# Patient Record
Sex: FEMALE | Race: WHITE | NOT HISPANIC OR LATINO | Employment: FULL TIME | URBAN - METROPOLITAN AREA
[De-identification: names, ages, dates, MRNs, and addresses within clinical notes are randomized per-mention and may not be internally consistent; named-entity substitution may affect disease eponyms.]

---

## 2017-06-14 ENCOUNTER — OFFICE VISIT (OUTPATIENT)
Dept: ORTHOPEDIC SURGERY | Facility: CLINIC | Age: 32
End: 2017-06-14

## 2017-06-14 VITALS
SYSTOLIC BLOOD PRESSURE: 110 MMHG | HEIGHT: 65 IN | BODY MASS INDEX: 29.32 KG/M2 | HEART RATE: 82 BPM | WEIGHT: 176 LBS | DIASTOLIC BLOOD PRESSURE: 64 MMHG

## 2017-06-14 DIAGNOSIS — S93.412A SPRAIN OF CALCANEOFIBULAR LIGAMENT OF LEFT ANKLE, INITIAL ENCOUNTER: Primary | ICD-10-CM

## 2017-06-14 PROCEDURE — 99203 OFFICE O/P NEW LOW 30 MIN: CPT | Performed by: ORTHOPAEDIC SURGERY

## 2017-06-14 RX ORDER — GABAPENTIN 800 MG/1
900 TABLET ORAL 3 TIMES DAILY
COMMUNITY

## 2017-06-14 RX ORDER — BUDESONIDE AND FORMOTEROL FUMARATE DIHYDRATE 160; 4.5 UG/1; UG/1
2 AEROSOL RESPIRATORY (INHALATION)
COMMUNITY

## 2017-06-14 RX ORDER — CETIRIZINE HYDROCHLORIDE 10 MG/1
10 TABLET ORAL DAILY
COMMUNITY

## 2017-06-14 RX ORDER — LAMOTRIGINE 100 MG/1
400 TABLET ORAL DAILY
COMMUNITY

## 2017-06-14 RX ORDER — LURASIDONE HYDROCHLORIDE 40 MG/1
40 TABLET, FILM COATED ORAL DAILY
COMMUNITY
End: 2018-07-26

## 2017-06-14 RX ORDER — SPIRONOLACTONE 25 MG/1
25 TABLET ORAL DAILY
COMMUNITY
End: 2017-06-26

## 2017-06-14 RX ORDER — MONTELUKAST SODIUM 10 MG/1
10 TABLET ORAL NIGHTLY
COMMUNITY
End: 2018-07-26

## 2017-06-14 NOTE — PROGRESS NOTES
Subjective: Left foot and ankle pain     Patient ID: Tanisha Bustamante is a 32 y.o. female.    Chief Complaint:    History of Present Illness 32-year-old female injured her left foot and ankle playing with her Regarding class back on 30 May which sustained in an apparent inversion injury of the foot.  Was seen in urgent care and x-rays done which showed a possible hairline fracture the fifth metatarsal base presents here for evaluation.  She was placed in a fracture boot at the urgent care center.       Social History     Occupational History   • Not on file.     Social History Main Topics   • Smoking status: Never Smoker   • Smokeless tobacco: Never Used   • Alcohol use Yes   • Drug use: Defer   • Sexual activity: Defer      Review of Systems   Constitutional: Negative for chills, diaphoresis, fever and unexpected weight change.   HENT: Negative for hearing loss, nosebleeds, sore throat and tinnitus.    Eyes: Negative for pain and visual disturbance.   Respiratory: Negative for cough, shortness of breath and wheezing.    Cardiovascular: Negative for chest pain and palpitations.   Gastrointestinal: Negative for abdominal pain, diarrhea, nausea and vomiting.   Endocrine: Negative for cold intolerance, heat intolerance and polydipsia.   Genitourinary: Negative for difficulty urinating, dysuria and hematuria.   Musculoskeletal: Negative for arthralgias, joint swelling and myalgias.   Skin: Negative for rash and wound.   Allergic/Immunologic: Positive for environmental allergies.   Neurological: Negative for dizziness, syncope and numbness.   Hematological: Does not bruise/bleed easily.   Psychiatric/Behavioral: Positive for sleep disturbance. Negative for dysphoric mood. The patient is not nervous/anxious.    All other systems reviewed and are negative.        Past Medical History:   Diagnosis Date   • Asthma    • Depression with anxiety      History reviewed. No pertinent surgical history.  History reviewed. No pertinent  family history.      Objective:  Vitals:    06/14/17 1343   BP: 110/64   Pulse: 82     Last 3 weights    06/14/17  1343   Weight: 176 lb (79.8 kg)     Body mass index is 29.29 kg/(m^2).       Ortho Exam  I reviewed the AP lateral oblique view of the left foot and ankle done in urgent care.  I believe I see where they think she might have a nondisplaced hairline fracture the base of the fifth metatarsal.  It is difficult to tell for sure.  On exam she is alert and oriented ×3.  Examination of the foot though shows actually no tenderness of the fifth metatarsal base.  There is some mild swelling over the lateral aspect of the foot and she describes more pain to the toes of the fourth and fifth.  But there is no real tenderness there.  Some mild tenderness over the calcaneofibular ligament but there is no instability there is no motor deficit good distal pulses no sensory loss no ecchymosis.  She is active dorsi and plantarflexion against resistance without pain and is again no pain whatsoever palpation over the fifth metatarsal.    Assessment:       1. Sprain of calcaneofibular ligament of left ankle, initial encounter          Plan:      I explained to her looking at the x-rays alone he can be difficult to tell if there is a fracture not correlated with her exam I believe there is no fracture.  Until she get out of the fracture boot and I would wear tennis shoes.  Support.  Continue the ibuprofen 800 3 times a day to see back on the 26th for follow-up evaluation.  She was in agreement with the above-stated plan.      Work Status:    GREGORIO query complete.    Orders:  No orders of the defined types were placed in this encounter.      Medications:  New Medications Ordered This Visit   Medications   • cetirizine (zyrTEC) 10 MG tablet     Sig: Take 10 mg by mouth Daily.   • montelukast (SINGULAIR) 10 MG tablet     Sig: Take 10 mg by mouth Every Night.   • spironolactone (ALDACTONE) 25 MG tablet     Sig: Take 25 mg by  mouth Daily.   • budesonide-formoterol (SYMBICORT) 160-4.5 MCG/ACT inhaler     Sig: Inhale 2 puffs 2 (Two) Times a Day.   • lurasidone (LATUDA) 40 MG tablet tablet     Sig: Take 40 mg by mouth Daily.   • lamoTRIgine (LaMICtal) 100 MG tablet     Sig: Take 400 mg by mouth Daily.   • gabapentin (NEURONTIN) 800 MG tablet     Sig: Take 900 mg by mouth 3 (Three) Times a Day.       Followup:  Return in about 12 days (around 6/26/2017).          Dragon transcription disclaimer     Much of this encounter note is an electronic transcription/translation of spoken language to printed text. The electronic translation of spoken language may permit erroneous, or at times, nonsensical words or phrases to be inadvertently transcribed. Although I have reviewed the note for such errors, some may still exist.

## 2017-06-26 ENCOUNTER — OFFICE VISIT (OUTPATIENT)
Dept: ORTHOPEDIC SURGERY | Facility: CLINIC | Age: 32
End: 2017-06-26

## 2017-06-26 VITALS — WEIGHT: 176 LBS | HEIGHT: 65 IN | BODY MASS INDEX: 29.32 KG/M2

## 2017-06-26 DIAGNOSIS — S93.412A SPRAIN OF CALCANEOFIBULAR LIGAMENT OF LEFT ANKLE, INITIAL ENCOUNTER: Primary | ICD-10-CM

## 2017-06-26 PROCEDURE — 99212 OFFICE O/P EST SF 10 MIN: CPT | Performed by: ORTHOPAEDIC SURGERY

## 2017-06-26 RX ORDER — SPIRONOLACTONE 100 MG/1
TABLET, FILM COATED ORAL
COMMUNITY
Start: 2017-06-20

## 2017-06-26 NOTE — PROGRESS NOTES
Subjective: Left ankle sprain     Patient ID: Tanisha Bustamante is a 32 y.o. female.    Chief Complaint:    History of Present Illness patient seen in follow-up to the left ankle sprain.  At this time she completely asymptomatic with no pain in the ankle.  His no tenderness with ambulation or any activity.  She is not wearing the boot this time is had no discomfort.       Social History     Occupational History   • Not on file.     Social History Main Topics   • Smoking status: Never Smoker   • Smokeless tobacco: Never Used   • Alcohol use Yes   • Drug use: Defer   • Sexual activity: Defer      Review of Systems   Constitutional: Negative for chills, diaphoresis, fever and unexpected weight change.   HENT: Negative for hearing loss, nosebleeds, sore throat and tinnitus.    Eyes: Negative for pain and visual disturbance.   Respiratory: Negative for cough, shortness of breath and wheezing.    Cardiovascular: Negative for chest pain and palpitations.   Gastrointestinal: Negative for abdominal pain, diarrhea, nausea and vomiting.   Endocrine: Negative for cold intolerance, heat intolerance and polydipsia.   Genitourinary: Negative for difficulty urinating, dysuria and hematuria.   Musculoskeletal: Positive for joint swelling and myalgias.   Skin: Negative for rash and wound.   Allergic/Immunologic: Negative for environmental allergies.   Neurological: Negative for dizziness, syncope and numbness.   Hematological: Does not bruise/bleed easily.   Psychiatric/Behavioral: Negative for dysphoric mood and sleep disturbance. The patient is not nervous/anxious.          Past Medical History:   Diagnosis Date   • Asthma    • Depression with anxiety      History reviewed. No pertinent surgical history.  History reviewed. No pertinent family history.      Objective:  There were no vitals filed for this visit.  Last 3 weights    06/26/17  1351   Weight: 176 lb (79.8 kg)     Body mass index is 29.29 kg/(m^2).       Ortho Exam  she is  alert and oriented ×3.  Examination of the ankle shows no tenderness over the lateral medial ligament complex.  No pain over the base of the fifth metacarpal with her was a question as to a nondisplaced fracture.  There is no swelling noted good distal pulses no sensory loss.    Assessment:       1. Sprain of calcaneofibular ligament of left ankle, initial encounter          Plan:      activity as tolerated return to see me as needed.  Patient will resume all activities      Work Status:    GREGORIO query complete.    Orders:  No orders of the defined types were placed in this encounter.      Medications:  New Medications Ordered This Visit   Medications   • spironolactone (ALDACTONE) 100 MG tablet       Followup:  Return if symptoms worsen or fail to improve.          Dragon transcription disclaimer     Much of this encounter note is an electronic transcription/translation of spoken language to printed text. The electronic translation of spoken language may permit erroneous, or at times, nonsensical words or phrases to be inadvertently transcribed. Although I have reviewed the note for such errors, some may still exist.

## 2018-07-26 ENCOUNTER — OFFICE VISIT (OUTPATIENT)
Dept: ORTHOPEDIC SURGERY | Facility: CLINIC | Age: 33
End: 2018-07-26

## 2018-07-26 VITALS — TEMPERATURE: 98.4 F | WEIGHT: 165 LBS | BODY MASS INDEX: 27.49 KG/M2 | HEIGHT: 65 IN

## 2018-07-26 DIAGNOSIS — S83.241A OTHER TEAR OF MEDIAL MENISCUS OF RIGHT KNEE AS CURRENT INJURY, INITIAL ENCOUNTER: ICD-10-CM

## 2018-07-26 DIAGNOSIS — G89.29 CHRONIC PAIN OF RIGHT KNEE: Primary | ICD-10-CM

## 2018-07-26 DIAGNOSIS — M25.561 CHRONIC PAIN OF RIGHT KNEE: Primary | ICD-10-CM

## 2018-07-26 PROCEDURE — 99213 OFFICE O/P EST LOW 20 MIN: CPT | Performed by: ORTHOPAEDIC SURGERY

## 2018-07-26 PROCEDURE — 73562 X-RAY EXAM OF KNEE 3: CPT | Performed by: ORTHOPAEDIC SURGERY

## 2018-07-26 RX ORDER — FOLIC ACID 1 MG/1
1 TABLET ORAL DAILY
COMMUNITY

## 2018-07-26 RX ORDER — CETIRIZINE HYDROCHLORIDE 10 MG/1
TABLET ORAL
COMMUNITY
End: 2018-07-26

## 2018-07-26 RX ORDER — CHLORAL HYDRATE 500 MG
CAPSULE ORAL
COMMUNITY

## 2018-07-26 RX ORDER — TRAZODONE HYDROCHLORIDE 100 MG/1
100 TABLET ORAL
COMMUNITY
End: 2018-07-26

## 2018-07-26 RX ORDER — LAMOTRIGINE 100 MG/1
TABLET ORAL
COMMUNITY
End: 2018-07-26 | Stop reason: SDUPTHER

## 2018-07-26 NOTE — PROGRESS NOTES
New Right Knee      Patient: Tanisha POON        YOB: 1985    Medical Record Number: [id number]        Chief Complaints: right knee pain  Chief Complaint   Patient presents with   • Right Knee - Establish Care           History of Present Illness: This is a  33 y.o. female who presents complaining of right knee pain began first of April she was running stress fracture her footas diagnosed by podiatrist also noticed pain anterior medially in the right knee.  She was training for the mini at that time she has some night pain had some swelling has pain going down hills no prior history of similar symptoms she is a teacher [employer] symptoms are described as moderate intermittent stabbing with symptoms worsening with walking or running better with anti-inflammatory past medical history marked for asthma depression    Allergies: No Known Allergies    Medications:   Home Medications:  Current Outpatient Prescriptions on File Prior to Visit   Medication Sig   • budesonide-formoterol (SYMBICORT) 160-4.5 MCG/ACT inhaler Inhale 2 puffs 2 (Two) Times a Day.   • cetirizine (zyrTEC) 10 MG tablet Take 10 mg by mouth Daily.   • gabapentin (NEURONTIN) 800 MG tablet Take 900 mg by mouth 3 (Three) Times a Day.   • lamoTRIgine (LaMICtal) 100 MG tablet Take 400 mg by mouth Daily.   • spironolactone (ALDACTONE) 100 MG tablet    • traZODone (DESYREL) 100 MG tablet TAKE 2 TABLETS BY MOUTH AT BEDTIME    • [DISCONTINUED] lurasidone (LATUDA) 40 MG tablet tablet Take 40 mg by mouth Daily.   • [DISCONTINUED] montelukast (SINGULAIR) 10 MG tablet Take 10 mg by mouth Every Night.     No current facility-administered medications on file prior to visit.      Current Medications:  Scheduled Meds:  Continuous Infusions:  No current facility-administered medications for this visit.   PRN Meds:.    Past Medical History:   Diagnosis Date   • Asthma    • Depression with anxiety       History reviewed. No pertinent surgical  "history.     Social History     Occupational History   • Not on file.     Social History Main Topics   • Smoking status: Never Smoker   • Smokeless tobacco: Never Used   • Alcohol use Yes   • Drug use: Unknown   • Sexual activity: Defer    Social History     Social History Narrative   • No narrative on file      History reviewed. No pertinent family history.          Review of Systems: 14 point review of systems are remarkable forpertinent positives listed in the chart by the patient the remainder are negative    Review of Systems      Physical Exam: 33 y.o. female  General Appearance:    Alert, cooperative, in no acute distress                 Vitals:    07/26/18 1119   Temp: 98.4 °F (36.9 °C)   Weight: 74.8 kg (165 lb)   Height: 165.1 cm (65\")      Patient is alert and read ×3 no acute distress appears her above-listed at height weight and age.  Affect is normal respiratory rate is normal unlabored. Heart rate regular rate rhythm, sclera, dentition and hearing are normal for the purpose of this exam.        Ortho Exam Physical exam of the right  knee reveals no effusion no redness.  The patient does have tenderness about the medial l joint line.  No tenderness about the lateral l joint line.  A negative bounce home and a positive l medial Jessie.    Patient has a stable ligamentous exam.  The patient has a negative Lachman and negative anterior drawer and a negative pivot shift.  Quads are reasonable and symmetric bilaterally.  Calf is soft and nontender.  There is no overlying skin changes no lymphedema lymphadenopathy.  Patient has good hip range of motion full symmetric and asymptomatic and a normal ankle exam.  She has good distal pulses and sensation distally is intact        Procedures             Radiology:   AP, Lateral and merchant views of the right knee  were ordered/reviewed to evauateknee pain. o comparative films these are normal good manus were joint spine's no evidence of any acute bony " pathology  Imaging Results (most recent)     Procedure Component Value Units Date/Time    XR Knee 3 View Right [[id number]] Resulted:  07/26/18 1119     Updated:  07/26/18 1119    Impression:       Ordering physician's impression is located in the Encounter Note dated 07/26/18. X-ray performed in the DR room.          Assessment/Plan:  Severe right knee pain ongoing since April following a running concerned about meniscal pathology also about stress fracture plan is proceed with an MRI and have her follow-up after that test

## 2018-08-01 ENCOUNTER — HOSPITAL ENCOUNTER (OUTPATIENT)
Dept: MRI IMAGING | Facility: HOSPITAL | Age: 33
Discharge: HOME | End: 2018-08-01
Attending: ORTHOPAEDIC SURGERY | Admitting: ORTHOPAEDIC SURGERY

## 2018-08-01 DIAGNOSIS — S83.241A OTHER TEAR OF MEDIAL MENISCUS OF RIGHT KNEE AS CURRENT INJURY, INITIAL ENCOUNTER: ICD-10-CM

## 2018-08-01 PROCEDURE — 73721 MRI JNT OF LWR EXTRE W/O DYE: CPT

## 2018-08-10 ENCOUNTER — OFFICE VISIT (OUTPATIENT)
Dept: ORTHOPEDIC SURGERY | Facility: CLINIC | Age: 33
End: 2018-08-10

## 2018-08-10 VITALS — HEIGHT: 65 IN | TEMPERATURE: 97.7 F | WEIGHT: 165 LBS | BODY MASS INDEX: 27.49 KG/M2

## 2018-08-10 DIAGNOSIS — G89.29 CHRONIC PAIN OF RIGHT KNEE: Primary | ICD-10-CM

## 2018-08-10 DIAGNOSIS — M25.561 CHRONIC PAIN OF RIGHT KNEE: Primary | ICD-10-CM

## 2018-08-10 PROCEDURE — 20610 DRAIN/INJ JOINT/BURSA W/O US: CPT | Performed by: ORTHOPAEDIC SURGERY

## 2018-08-10 PROCEDURE — 99213 OFFICE O/P EST LOW 20 MIN: CPT | Performed by: ORTHOPAEDIC SURGERY

## 2018-08-10 RX ORDER — CLONIDINE HYDROCHLORIDE 0.1 MG/1
0.1 TABLET ORAL
COMMUNITY

## 2018-08-10 RX ADMIN — LIDOCAINE HYDROCHLORIDE 4 ML: 10 INJECTION, SOLUTION EPIDURAL; INFILTRATION; INTRACAUDAL; PERINEURAL at 08:39

## 2018-08-10 RX ADMIN — METHYLPREDNISOLONE ACETATE 80 MG: 80 INJECTION, SUSPENSION INTRA-ARTICULAR; INTRALESIONAL; INTRAMUSCULAR; SOFT TISSUE at 08:39

## 2018-08-10 NOTE — PROGRESS NOTES
"Right Knee MRI Follow Up      Patient: Tanisha POON        YOB: 1985            Chief Complaints: Right Knee pain  Chief Complaint   Patient presents with   • Right Knee - Follow-up, Pain         History of Present Illness: The patient is here follow-up of an MRI of the kneeMRI is negative are still moderate intermittent catching locking  Physical Exam: 33 y.o. female  General Appearance:    Alert, cooperative, in no acute distress                   Vitals:    08/10/18 0822   Temp: 97.7 °F (36.5 °C)   TempSrc: Temporal Artery    Weight: 74.8 kg (165 lb)   Height: 165.1 cm (65\")        Patient is alert and read ×3 no acute distress appears her above-listed at height weight and age.  Affect is normal respiratory rate is normal unlabored. Heart rate regular rate rhythm, sclera, dentition and hearing are normal for the purpose of this exam.      Ortho Exam  Physical exam of the right knee reveals no effusion, no erythema.  The patient has no palpable tenderness along the medial joint line, no tenderness about the lateral joint line.  Patient does have crepitus with patellofemoral range of motion.  They also have subjective symptoms anteriorly during exam.  The patient has a negative bounce home, negative Jessie and a stable ligamentous exam.  Quad tone is reasonable and symmetric.  There are no overlying skin changes no lymphedema no lymphadenopathy.  There is good hip range of motion which is full symmetric and asymptomatic and a normal ankle exam.  Hamstrings and IT band are tight bilaterally.      MRI Results:Large Joint Arthrocentesis  Date/Time: 8/10/2018 8:39 AM  Consent given by: patient  Site marked: site marked  Timeout: Immediately prior to procedure a time out was called to verify the correct patient, procedure, equipment, support staff and site/side marked as required   Supporting Documentation  Indications: pain   Procedure Details  Location: knee - R knee  Needle size: 25 G  Approach: " anteromedial  Medications administered: 80 mg methylPREDNISolone acetate 80 MG/ML; 4 mL lidocaine PF 1% 1 %  Patient tolerance: patient tolerated the procedure well with no immediate complications        MRI is as above I have reviewed the films and agree with the findings    Assessment/Plan:  Right knee pain I think this is going to want to being more patellofemoral think she benefit from an injection some aggressive physical therapy which she is agreeable to do that fails to work with could consider other means of treatment

## 2018-08-15 RX ORDER — LIDOCAINE HYDROCHLORIDE 10 MG/ML
4 INJECTION, SOLUTION EPIDURAL; INFILTRATION; INTRACAUDAL; PERINEURAL
Status: COMPLETED | OUTPATIENT
Start: 2018-08-10 | End: 2018-08-10

## 2018-08-15 RX ORDER — METHYLPREDNISOLONE ACETATE 80 MG/ML
80 INJECTION, SUSPENSION INTRA-ARTICULAR; INTRALESIONAL; INTRAMUSCULAR; SOFT TISSUE
Status: COMPLETED | OUTPATIENT
Start: 2018-08-10 | End: 2018-08-10

## 2020-10-12 ENCOUNTER — TRANSCRIBE ORDERS (OUTPATIENT)
Dept: ADMINISTRATIVE | Facility: HOSPITAL | Age: 35
End: 2020-10-12

## 2020-10-12 ENCOUNTER — HOSPITAL ENCOUNTER (OUTPATIENT)
Dept: GENERAL RADIOLOGY | Facility: HOSPITAL | Age: 35
Discharge: HOME | End: 2020-10-12
Admitting: INTERNAL MEDICINE

## 2020-10-12 DIAGNOSIS — R52 PAIN: ICD-10-CM

## 2020-10-12 DIAGNOSIS — T14.90XA TRAUMA: Primary | ICD-10-CM

## 2020-10-12 DIAGNOSIS — T14.90XA TRAUMA: ICD-10-CM

## 2020-10-12 PROCEDURE — 73120 X-RAY EXAM OF HAND: CPT

## 2020-11-06 ENCOUNTER — HOSPITAL ENCOUNTER (OUTPATIENT)
Dept: GENERAL RADIOLOGY | Facility: HOSPITAL | Age: 35
Discharge: HOME | End: 2020-11-06
Admitting: INTERNAL MEDICINE

## 2020-11-06 ENCOUNTER — TRANSCRIBE ORDERS (OUTPATIENT)
Dept: ADMINISTRATIVE | Facility: HOSPITAL | Age: 35
End: 2020-11-06

## 2020-11-06 DIAGNOSIS — M79.644 FINGER PAIN, RIGHT: ICD-10-CM

## 2020-11-06 DIAGNOSIS — M79.644 FINGER PAIN, RIGHT: Primary | ICD-10-CM

## 2020-11-06 PROCEDURE — 73120 X-RAY EXAM OF HAND: CPT

## 2021-02-04 ENCOUNTER — OFFICE VISIT (OUTPATIENT)
Dept: INTERNAL MEDICINE | Facility: CLINIC | Age: 36
End: 2021-02-04

## 2021-02-04 VITALS
DIASTOLIC BLOOD PRESSURE: 68 MMHG | HEIGHT: 65 IN | SYSTOLIC BLOOD PRESSURE: 104 MMHG | HEART RATE: 94 BPM | OXYGEN SATURATION: 99 % | TEMPERATURE: 98.2 F | WEIGHT: 137 LBS | BODY MASS INDEX: 22.82 KG/M2 | RESPIRATION RATE: 16 BRPM

## 2021-02-04 DIAGNOSIS — S69.91XD INJURY OF FINGER OF RIGHT HAND, SUBSEQUENT ENCOUNTER: Primary | ICD-10-CM

## 2021-02-04 PROCEDURE — 99213 OFFICE O/P EST LOW 20 MIN: CPT | Performed by: INTERNAL MEDICINE

## 2021-02-04 RX ORDER — MONTELUKAST SODIUM 10 MG/1
TABLET ORAL
COMMUNITY
Start: 2020-11-02

## 2021-02-04 RX ORDER — AMITRIPTYLINE HYDROCHLORIDE 50 MG/1
TABLET, FILM COATED ORAL
COMMUNITY
Start: 2020-12-16

## 2021-02-04 RX ORDER — TOPIRAMATE 100 MG/1
TABLET, FILM COATED ORAL
COMMUNITY
Start: 2020-12-17

## 2021-02-04 RX ORDER — ALBUTEROL SULFATE 90 UG/1
AEROSOL, METERED RESPIRATORY (INHALATION)
COMMUNITY
Start: 2020-11-03

## 2021-02-04 NOTE — PROGRESS NOTES
"Chief Complaint  Hand Pain (RT INDEX FINGER PAIN X 3 MONTHS)    Subjective          Tanisha POON presents to [facility] for   History of Present Illness   Injury to right index finger in October 2020, still swollen and bruised. Not able to fully bend finger and hurts when tries to bend.   Not currently taking any OTC medications.     Objective   Vital Signs:   /68 (BP Location: Left arm, Patient Position: Sitting, Cuff Size: Adult)   Pulse 94   Temp 98.2 °F (36.8 °C)   Resp 16   Ht 165.1 cm (65\")   Wt 62.1 kg (137 lb)   SpO2 99%   BMI 22.80 kg/m²     Physical Exam  Constitutional:       General: She is not in acute distress.     Appearance: She is normal weight.   HENT:      Head: Normocephalic and atraumatic.      Right Ear: External ear normal.      Left Ear: External ear normal.   Neck:      Musculoskeletal: Normal range of motion.   Pulmonary:      Effort: Pulmonary effort is normal. No respiratory distress.   Skin:     General: Skin is warm and dry.   Neurological:      Mental Status: She is alert.   Psychiatric:         Mood and Affect: Mood normal.         Behavior: Behavior normal.         Thought Content: Thought content normal.         Judgment: Judgment normal.      MSK:     Right index finger still with significant swelling and bruising, sensation in tact, unable to fully abduct index finger    Result Review :   The following data was reviewed by: Funmi Summers MD on 02/04/2021:  X ray Right hand 10/12/20 and X ray Right hand 11/6/20 - soft tissue swelling no evidence of fracture or dislocation.            Assessment and Plan    Diagnoses and all orders for this visit:    1. Injury of finger of right hand, subsequent encounter (Primary)  -     MRI Hand Right Without Contrast; Future    - Injured right index finger in Oct 2020, still significant swelling and pain, reviewed imaging from October and November, do not suspect fracture but " possible ligamentous injury      Follow Up   No follow-ups on file.  Patient was given instructions and counseling regarding her condition or for health maintenance advice. Please see specific information pulled into the AVS if appropriate.     Patient seen and discussed with attending, Dr. Benson.     Funmi Summers MD   Internal Medicine & Pediatrics Resident, PGY-4  [facility]       Note reviewed and discussed with resident physician. Discussed with patient as well and agree with plan.

## 2021-03-01 ENCOUNTER — HOSPITAL ENCOUNTER (OUTPATIENT)
Dept: MRI IMAGING | Facility: HOSPITAL | Age: 36
Discharge: HOME | End: 2021-03-01
Admitting: INTERNAL MEDICINE

## 2021-03-01 DIAGNOSIS — S69.91XD INJURY OF FINGER OF RIGHT HAND, SUBSEQUENT ENCOUNTER: ICD-10-CM

## 2021-03-01 PROCEDURE — 73218 MRI UPPER EXTREMITY W/O DYE: CPT

## 2021-03-09 ENCOUNTER — TELEPHONE (OUTPATIENT)
Dept: INTERNAL MEDICINE | Facility: CLINIC | Age: 36
End: 2021-03-09

## 2021-03-09 NOTE — TELEPHONE ENCOUNTER
Caller: Tanisha POON    Relationship: Self    Best call back number: [phone]    What test was performed: MR allan usantos jnt rt wo contrast    When was the test performed: 03/01    Where was the test performed: [facility]      Additional notes: HASN'T HEARD ANYTHING SINCE IT WAS DONE. NEEDS RESULTS

## 2021-03-10 ENCOUNTER — PATIENT MESSAGE (OUTPATIENT)
Dept: INTERNAL MEDICINE | Facility: CLINIC | Age: 36
End: 2021-03-10

## 2021-03-10 DIAGNOSIS — S69.91XD INJURY OF FINGER OF RIGHT HAND, SUBSEQUENT ENCOUNTER: Primary | ICD-10-CM

## 2021-04-16 ENCOUNTER — BULK ORDERING (OUTPATIENT)
Dept: CASE MANAGEMENT | Facility: OTHER | Age: 36
End: 2021-04-16

## 2021-04-16 DIAGNOSIS — Z23 IMMUNIZATION DUE: ICD-10-CM

## 2021-04-21 ENCOUNTER — HOSPITAL ENCOUNTER (OUTPATIENT)
Dept: GENERAL RADIOLOGY | Facility: HOSPITAL | Age: 36
Discharge: HOME | End: 2021-04-21
Admitting: INTERNAL MEDICINE

## 2021-04-21 ENCOUNTER — OFFICE VISIT (OUTPATIENT)
Dept: INTERNAL MEDICINE | Facility: CLINIC | Age: 36
End: 2021-04-21

## 2021-04-21 VITALS
RESPIRATION RATE: 16 BRPM | HEIGHT: 65 IN | BODY MASS INDEX: 22.33 KG/M2 | TEMPERATURE: 98 F | HEART RATE: 96 BPM | SYSTOLIC BLOOD PRESSURE: 110 MMHG | DIASTOLIC BLOOD PRESSURE: 70 MMHG | OXYGEN SATURATION: 100 % | WEIGHT: 134 LBS

## 2021-04-21 DIAGNOSIS — M25.561 ACUTE PAIN OF RIGHT KNEE: Primary | ICD-10-CM

## 2021-04-21 DIAGNOSIS — M25.561 ACUTE PAIN OF RIGHT KNEE: ICD-10-CM

## 2021-04-21 PROCEDURE — 99212 OFFICE O/P EST SF 10 MIN: CPT | Performed by: INTERNAL MEDICINE

## 2021-04-21 PROCEDURE — 73560 X-RAY EXAM OF KNEE 1 OR 2: CPT

## 2021-04-21 RX ORDER — LAMOTRIGINE 200 MG/1
TABLET ORAL
COMMUNITY
Start: 2021-04-02 | End: 2021-04-21 | Stop reason: SDUPTHER

## 2021-04-21 NOTE — PROGRESS NOTES
"Chief Complaint  Knee Pain (RT KNEE PAIN/ FELL 2 WEEKS AGO/ HURTS MOST WHEN SHE GOES [place]ILL/VERY ACTIVE NORMALLY)    Subjective          Tanisha POON presents to [facility]  2 weeks ago fell onto right knee while hiking; mild swelling, no redness, no warmth; did complete a 9 mile hike after this event      Objective   Vital Signs:   /70 (BP Location: Left arm, Patient Position: Sitting, Cuff Size: Adult)   Pulse 96   Temp 98 °F (36.7 °C)   Resp 16   Ht 165.1 cm (65\")   Wt 60.8 kg (134 lb)   SpO2 100%   BMI 22.30 kg/m²     Physical Exam  Vitals and nursing note reviewed.   Constitutional:       Appearance: Normal appearance.   HENT:      Head: Normocephalic and atraumatic.      Right Ear: External ear normal.      Left Ear: External ear normal.      Nose: Nose normal.      Mouth/Throat:      Mouth: Mucous membranes are moist.      Pharynx: Oropharynx is clear.   Eyes:      Extraocular Movements: Extraocular movements intact.      Conjunctiva/sclera: Conjunctivae normal.   Pulmonary:      Effort: Pulmonary effort is normal. No respiratory distress.   Musculoskeletal:         General: Normal range of motion.      Cervical back: Normal range of motion.      Comments: Knee with mild medial ttp, full rom without pain, swelling   Skin:     Findings: No rash.   Neurological:      General: No focal deficit present.      Mental Status: She is alert. Mental status is at baseline.   Psychiatric:         Mood and Affect: Mood normal.         Behavior: Behavior normal.         Thought Content: Thought content normal.         Judgment: Judgment normal.        Result Review :                 Assessment and Plan    Diagnoses and all orders for this visit:    1. Acute pain of right knee (Primary)  -     XR Knee 1 or 2 View Right; Future    - xray as above  - discussed supportive care, rest, ice, compression  - increase activity pending above, will need further " evaluation if no improvement with above      Follow Up   Return in about 4 weeks (around 5/19/2021) for Recheck.  Patient was given instructions and counseling regarding her condition or for health maintenance advice. Please see specific information pulled into the AVS if appropriate.

## 2021-08-13 ENCOUNTER — OFFICE VISIT (OUTPATIENT)
Dept: INTERNAL MEDICINE | Facility: CLINIC | Age: 36
End: 2021-08-13

## 2021-08-13 VITALS
RESPIRATION RATE: 16 BRPM | HEIGHT: 65 IN | BODY MASS INDEX: 22.82 KG/M2 | HEART RATE: 88 BPM | WEIGHT: 137 LBS | DIASTOLIC BLOOD PRESSURE: 62 MMHG | TEMPERATURE: 97.5 F | SYSTOLIC BLOOD PRESSURE: 108 MMHG | OXYGEN SATURATION: 100 %
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
## 2021-08-13 DIAGNOSIS — S70.362S INSECT BITE OF LEFT THIGH, SEQUELA: Primary | ICD-10-CM

## 2021-08-13 PROCEDURE — 99214 OFFICE O/P EST MOD 30 MIN: CPT | Performed by: INTERNAL MEDICINE

## 2021-08-13 RX ORDER — DOXYCYCLINE HYCLATE 100 MG/1
100 CAPSULE ORAL 2 TIMES DAILY
Qty: 20 CAPSULE | Refills: 0 | Status: SHIPPED | OUTPATIENT
Start: 2021-08-13 | End: 2021-08-23

## 2021-08-13 RX ORDER — LAMOTRIGINE 200 MG/1
TABLET ORAL
COMMUNITY
Start: 2021-06-14

## 2021-08-13 RX ORDER — DILTIAZEM HYDROCHLORIDE 60 MG/1
TABLET, FILM COATED ORAL
COMMUNITY
Start: 2021-07-27

## 2021-08-13 NOTE — PROGRESS NOTES
"Chief Complaint  Insect Bite (INSECT BITES THAT ARE GETTING BIGGER)    Subjective          Tanisha POON presents to [facility]  Recent insect bites, has been doing a lot of outdoor activities; left upper thigh with small bug bite that has continued to expand with redness, mild itching but non significant; has had similar issues during this time with a bullseye appearance she states; no known ticks; no fevers, no joint pains    Of note recent trip to [facility] for hiking, returned 7/27/21      Objective   Vital Signs:   /62 (BP Location: Left arm, Patient Position: Sitting, Cuff Size: Adult)   Pulse 88   Temp 97.5 °F (36.4 °C) (Temporal)   Resp 16   Ht 165.1 cm (65\")   Wt 62.1 kg (137 lb)   SpO2 100%   BMI 22.80 kg/m²     Physical Exam  Vitals and nursing note reviewed.   Constitutional:       Appearance: Normal appearance.   HENT:      Head: Normocephalic and atraumatic.      Right Ear: External ear normal.      Left Ear: External ear normal.      Nose: Nose normal.      Mouth/Throat:      Mouth: Mucous membranes are moist.      Pharynx: Oropharynx is clear.   Eyes:      Extraocular Movements: Extraocular movements intact.      Conjunctiva/sclera: Conjunctivae normal.   Pulmonary:      Effort: Pulmonary effort is normal. No respiratory distress.   Musculoskeletal:         General: Normal range of motion.      Cervical back: Normal range of motion.   Skin:     Findings: No rash.      Comments: Left upper thigh with large 4cm diameter erythematous, not warm lesion   Neurological:      General: No focal deficit present.      Mental Status: She is alert. Mental status is at baseline.   Psychiatric:         Mood and Affect: Mood normal.         Behavior: Behavior normal.         Thought Content: Thought content normal.         Judgment: Judgment normal.        Result Review :                 Assessment and Plan    Diagnoses and all orders for this " visit:    1. Insect bite of left thigh, sequela (Primary)    - consider lyme disease though much less likely; may have had STARI with the rash but no other associated symptoms; with her description of typical bullseye lesion will treat empirically with doxycycline for systemic tick associated illness, prevention  - rtc worsening, change in illness      Follow Up   No follow-ups on file.  Patient was given instructions and counseling regarding her condition or for health maintenance advice. Please see specific information pulled into the AVS if appropriate.

## 2021-09-09 ENCOUNTER — TELEPHONE (OUTPATIENT)
Dept: INTERNAL MEDICINE | Facility: CLINIC | Age: 36
End: 2021-09-09

## 2021-09-09 NOTE — TELEPHONE ENCOUNTER
PATIENT IS CALLING TO SEE IF SHE CAN GET A COVID TEST DUE TO BEING EXPOSED.  SHE WAS OUT SIDE AND AROUND THIS PERSON FOR 2 TO 3 HOURS ABOUT 2 TO 3 FEET APART AND THIS PERSON JUST MADE HER AWARE THAT SHE WAS POSITIVE FOR COVID.  SHE IS VACCINATED AND SHOULD SHE BE TESTED?  PLEASE ADVISE [phone].  IF NO ANSWER, PLEASE LEAVE A MESSAGE.

## 2021-09-10 ENCOUNTER — TELEPHONE (OUTPATIENT)
Dept: INTERNAL MEDICINE | Facility: CLINIC | Age: 36
End: 2021-09-10

## 2021-09-10 NOTE — TELEPHONE ENCOUNTER
Hub to read:  Called patient to make her an appointment for a COVID test. Told her to call back and ask for me directly. (Elva)

## 2021-09-10 NOTE — TELEPHONE ENCOUNTER
Yes we can test her, she can come by for a test without seeing me, can you add to the schedule and let kane know?

## 2023-05-06 ENCOUNTER — APPOINTMENT (OUTPATIENT)
Dept: GENERAL RADIOLOGY | Facility: HOSPITAL | Age: 38
End: 2023-05-06
Payer: COMMERCIAL

## 2023-05-06 ENCOUNTER — HOSPITAL ENCOUNTER (EMERGENCY)
Facility: HOSPITAL | Age: 38
Discharge: HOME | End: 2023-05-06
Attending: EMERGENCY MEDICINE
Payer: COMMERCIAL

## 2023-05-06 VITALS
TEMPERATURE: 99.4 F | DIASTOLIC BLOOD PRESSURE: 65 MMHG | RESPIRATION RATE: 18 BRPM | SYSTOLIC BLOOD PRESSURE: 119 MMHG | OXYGEN SATURATION: 99 % | HEIGHT: 65 IN | BODY MASS INDEX: 22.8 KG/M2 | HEART RATE: 80 BPM

## 2023-05-06 DIAGNOSIS — E87.6 HYPOKALEMIA: ICD-10-CM

## 2023-05-06 DIAGNOSIS — R06.00 DYSPNEA, UNSPECIFIED TYPE: Primary | ICD-10-CM

## 2023-05-06 LAB
ALBUMIN SERPL-MCNC: 4.4 G/DL (ref 3.5–5.2)
ALBUMIN/GLOB SERPL: 2.1 G/DL
ALP SERPL-CCNC: 61 U/L (ref 39–117)
ALT SERPL W P-5'-P-CCNC: 17 U/L (ref 1–33)
ANION GAP SERPL CALCULATED.3IONS-SCNC: 12.8 MMOL/L (ref 5–15)
AST SERPL-CCNC: 12 U/L (ref 1–32)
B PARAPERT DNA SPEC QL NAA+PROBE: NOT DETECTED
B PERT DNA SPEC QL NAA+PROBE: NOT DETECTED
BASOPHILS # BLD AUTO: 0.19 10*3/MM3 (ref 0–0.2)
BASOPHILS NFR BLD AUTO: 1.3 % (ref 0–1.5)
BILIRUB SERPL-MCNC: 0.4 MG/DL (ref 0–1.2)
BUN SERPL-MCNC: 13 MG/DL (ref 6–20)
BUN/CREAT SERPL: 15.1 (ref 7–25)
C PNEUM DNA NPH QL NAA+NON-PROBE: NOT DETECTED
CALCIUM SPEC-SCNC: 9.5 MG/DL (ref 8.6–10.5)
CHLORIDE SERPL-SCNC: 106 MMOL/L (ref 98–107)
CO2 SERPL-SCNC: 21.2 MMOL/L (ref 22–29)
CREAT SERPL-MCNC: 0.86 MG/DL (ref 0.57–1)
D DIMER PPP FEU-MCNC: <0.27 MCGFEU/ML (ref 0–0.5)
DEPRECATED RDW RBC AUTO: 38.8 FL (ref 37–54)
EGFRCR SERPLBLD CKD-EPI 2021: 87.7 ML/MIN/1.73
EOSINOPHIL # BLD AUTO: 0.27 10*3/MM3 (ref 0–0.4)
EOSINOPHIL NFR BLD AUTO: 1.9 % (ref 0.3–6.2)
ERYTHROCYTE [DISTWIDTH] IN BLOOD BY AUTOMATED COUNT: 11.9 % (ref 12.3–15.4)
FLUAV SUBTYP SPEC NAA+PROBE: NOT DETECTED
FLUBV RNA ISLT QL NAA+PROBE: NOT DETECTED
GLOBULIN UR ELPH-MCNC: 2.1 GM/DL
GLUCOSE SERPL-MCNC: 110 MG/DL (ref 65–99)
HADV DNA SPEC NAA+PROBE: NOT DETECTED
HCOV 229E RNA SPEC QL NAA+PROBE: NOT DETECTED
HCOV HKU1 RNA SPEC QL NAA+PROBE: NOT DETECTED
HCOV NL63 RNA SPEC QL NAA+PROBE: NOT DETECTED
HCOV OC43 RNA SPEC QL NAA+PROBE: NOT DETECTED
HCT VFR BLD AUTO: 39.1 % (ref 34–46.6)
HGB BLD-MCNC: 12.9 G/DL (ref 12–15.9)
HMPV RNA NPH QL NAA+NON-PROBE: NOT DETECTED
HPIV1 RNA ISLT QL NAA+PROBE: NOT DETECTED
HPIV2 RNA SPEC QL NAA+PROBE: NOT DETECTED
HPIV3 RNA NPH QL NAA+PROBE: NOT DETECTED
HPIV4 P GENE NPH QL NAA+PROBE: NOT DETECTED
IMM GRANULOCYTES # BLD AUTO: 0.28 10*3/MM3 (ref 0–0.05)
IMM GRANULOCYTES NFR BLD AUTO: 1.9 % (ref 0–0.5)
LYMPHOCYTES # BLD AUTO: 6.2 10*3/MM3 (ref 0.7–3.1)
LYMPHOCYTES NFR BLD AUTO: 42.5 % (ref 19.6–45.3)
M PNEUMO IGG SER IA-ACNC: NOT DETECTED
MCH RBC QN AUTO: 29.7 PG (ref 26.6–33)
MCHC RBC AUTO-ENTMCNC: 33 G/DL (ref 31.5–35.7)
MCV RBC AUTO: 90.1 FL (ref 79–97)
MONOCYTES # BLD AUTO: 1.21 10*3/MM3 (ref 0.1–0.9)
MONOCYTES NFR BLD AUTO: 8.3 % (ref 5–12)
NEUTROPHILS NFR BLD AUTO: 44.1 % (ref 42.7–76)
NEUTROPHILS NFR BLD AUTO: 6.43 10*3/MM3 (ref 1.7–7)
NRBC BLD AUTO-RTO: 0 /100 WBC (ref 0–0.2)
NT-PROBNP SERPL-MCNC: 137 PG/ML (ref 0–450)
PLATELET # BLD AUTO: 313 10*3/MM3 (ref 140–450)
PMV BLD AUTO: 9 FL (ref 6–12)
POTASSIUM SERPL-SCNC: 3.2 MMOL/L (ref 3.5–5.2)
PROT SERPL-MCNC: 6.5 G/DL (ref 6–8.5)
QT INTERVAL: 355 MS
RBC # BLD AUTO: 4.34 10*6/MM3 (ref 3.77–5.28)
RHINOVIRUS RNA SPEC NAA+PROBE: NOT DETECTED
RSV RNA NPH QL NAA+NON-PROBE: NOT DETECTED
SARS-COV-2 RNA NPH QL NAA+NON-PROBE: NOT DETECTED
SODIUM SERPL-SCNC: 140 MMOL/L (ref 136–145)
TROPONIN T SERPL HS-MCNC: <6 NG/L
WBC NRBC COR # BLD: 14.58 10*3/MM3 (ref 3.4–10.8)

## 2023-05-06 PROCEDURE — 99284 EMERGENCY DEPT VISIT MOD MDM: CPT

## 2023-05-06 PROCEDURE — 71046 X-RAY EXAM CHEST 2 VIEWS: CPT

## 2023-05-06 PROCEDURE — 0202U NFCT DS 22 TRGT SARS-COV-2: CPT | Performed by: EMERGENCY MEDICINE

## 2023-05-06 PROCEDURE — 84484 ASSAY OF TROPONIN QUANT: CPT | Performed by: EMERGENCY MEDICINE

## 2023-05-06 PROCEDURE — 85379 FIBRIN DEGRADATION QUANT: CPT | Performed by: EMERGENCY MEDICINE

## 2023-05-06 PROCEDURE — 93005 ELECTROCARDIOGRAM TRACING: CPT | Performed by: EMERGENCY MEDICINE

## 2023-05-06 PROCEDURE — 83880 ASSAY OF NATRIURETIC PEPTIDE: CPT | Performed by: EMERGENCY MEDICINE

## 2023-05-06 PROCEDURE — 85025 COMPLETE CBC W/AUTO DIFF WBC: CPT | Performed by: EMERGENCY MEDICINE

## 2023-05-06 PROCEDURE — 80053 COMPREHEN METABOLIC PANEL: CPT | Performed by: EMERGENCY MEDICINE

## 2023-05-06 RX ORDER — POTASSIUM CHLORIDE 750 MG/1
40 TABLET, FILM COATED, EXTENDED RELEASE ORAL ONCE
Status: COMPLETED | OUTPATIENT
Start: 2023-05-06 | End: 2023-05-06

## 2023-05-06 RX ADMIN — POTASSIUM CHLORIDE 40 MEQ: 750 TABLET, EXTENDED RELEASE ORAL at 16:45

## 2023-05-06 NOTE — ED NOTES
Patient has been having issues with her asthma, and did have bronchitis. She states that she feels like her medications are not working anymore and has been on steroids.

## 2023-05-06 NOTE — ED PROVIDER NOTES
EMERGENCY DEPARTMENT ENCOUNTER    Room Number:  25/25  Date seen:  5/6/2023  PCP: Provider, No Known  Historian: Patient      HPI:  Chief Complaint: Shortness of breath  A complete HPI/ROS/PMH/PSH/SH/FH are unobtainable due to: None  Context: Tanisha POON is a 38 y.o. female who presents to the ED c/o shortness of breath.  Patient states she has had history of asthma in the past.  Patient states for the last 10 days she has had a cough and shortness of breath.  Patient started using her albuterol.  Patient was started on prednisone.  Patient was then seen in urgent care again and was started on doxycycline nebulizer and continued prednisone.  Patient states she continues to have shortness of breath.  States she is having chest pain as well.  Has had no lower extremity swelling.  Has had no fevers or chills.  Patient states she is around children who have had similar symptoms.            PAST MEDICAL HISTORY  Active Ambulatory Problems     Diagnosis Date Noted   • Tear of medial meniscus of right knee, current 07/26/2018     Resolved Ambulatory Problems     Diagnosis Date Noted   • No Resolved Ambulatory Problems     Past Medical History:   Diagnosis Date   • Allergic rhinitis    • Asthma    • Bipolar disorder, unspecified    • Depression with anxiety    • Diarrhea, unspecified    • Increased BMI    • Medial epicondylitis, right elbow    • Nevus, non-neoplastic    • Obesity, unspecified    • Pain in unspecified ankle and joints of unspecified foot    • Papanicolaou smear    • Rash and nonspecific skin eruption    • Unspecified asthma, uncomplicated    • Unspecified superficial injury of right index finger, initial encounter          PAST SURGICAL HISTORY  Past Surgical History:   Procedure Laterality Date   • TONSILLECTOMY      AGE 6          FAMILY HISTORY  Family History   Problem Relation Age of Onset   • Heart attack Father         IN HIS 30S   • Kidney cancer Paternal Grandfather    • Heart attack Other     • Stroke Other    • Breast cancer Maternal Aunt          SOCIAL HISTORY  Social History     Socioeconomic History   • Marital status:    Tobacco Use   • Smoking status: Never   • Smokeless tobacco: Never   Substance and Sexual Activity   • Alcohol use: Yes     Comment: history of alcohol abuse problems in past related to depressive/manic episodes,    • Drug use: Defer   • Sexual activity: Defer         ALLERGIES  Patient has no known allergies.        REVIEW OF SYSTEMS  Review of Systems   Cough, shortness of breath    PHYSICAL EXAM  ED Triage Vitals [05/06/23 1430]   Temp Heart Rate Resp BP SpO2   99.4 °F (37.4 °C) 110 -- -- 94 %      Temp src Heart Rate Source Patient Position BP Location FiO2 (%)   -- -- -- -- --       Physical Exam      GENERAL: no acute distress  HENT: nares patent  EYES: no scleral icterus  CV: regular rhythm, normal rate  RESPIRATORY: normal effort. No wheezes  ABDOMEN: soft  MUSCULOSKELETAL: no deformity  NEURO: alert, moves all extremities, follows commands  PSYCH:  calm, cooperative  SKIN: warm, dry    Vital signs and nursing notes reviewed.          LAB RESULTS  Recent Results (from the past 24 hour(s))   Respiratory Panel PCR w/COVID-19(SARS-CoV-2) HEATHER/TAMERA/SYED/PAD/COR/MAD/TRENT In-House, NP Swab in UTM/VTM, 3-4 HR TAT - Swab, Nasopharynx    Collection Time: 05/06/23  2:47 PM    Specimen: Nasopharynx; Swab   Result Value Ref Range    ADENOVIRUS, PCR Not Detected Not Detected    Coronavirus 229E Not Detected Not Detected    Coronavirus HKU1 Not Detected Not Detected    Coronavirus NL63 Not Detected Not Detected    Coronavirus OC43 Not Detected Not Detected    COVID19 Not Detected Not Detected - Ref. Range    Human Metapneumovirus Not Detected Not Detected    Human Rhinovirus/Enterovirus Not Detected Not Detected    Influenza A PCR Not Detected Not Detected    Influenza B PCR Not Detected Not Detected    Parainfluenza Virus 1 Not Detected Not Detected    Parainfluenza Virus 2 Not  Detected Not Detected    Parainfluenza Virus 3 Not Detected Not Detected    Parainfluenza Virus 4 Not Detected Not Detected    RSV, PCR Not Detected Not Detected    Bordetella pertussis pcr Not Detected Not Detected    Bordetella parapertussis PCR Not Detected Not Detected    Chlamydophila pneumoniae PCR Not Detected Not Detected    Mycoplasma pneumo by PCR Not Detected Not Detected   Comprehensive Metabolic Panel    Collection Time: 05/06/23  2:52 PM    Specimen: Blood   Result Value Ref Range    Glucose 110 (H) 65 - 99 mg/dL    BUN 13 6 - 20 mg/dL    Creatinine 0.86 0.57 - 1.00 mg/dL    Sodium 140 136 - 145 mmol/L    Potassium 3.2 (L) 3.5 - 5.2 mmol/L    Chloride 106 98 - 107 mmol/L    CO2 21.2 (L) 22.0 - 29.0 mmol/L    Calcium 9.5 8.6 - 10.5 mg/dL    Total Protein 6.5 6.0 - 8.5 g/dL    Albumin 4.4 3.5 - 5.2 g/dL    ALT (SGPT) 17 1 - 33 U/L    AST (SGOT) 12 1 - 32 U/L    Alkaline Phosphatase 61 39 - 117 U/L    Total Bilirubin 0.4 0.0 - 1.2 mg/dL    Globulin 2.1 gm/dL    A/G Ratio 2.1 g/dL    BUN/Creatinine Ratio 15.1 7.0 - 25.0    Anion Gap 12.8 5.0 - 15.0 mmol/L    eGFR 87.7 >60.0 mL/min/1.73   BNP    Collection Time: 05/06/23  2:52 PM    Specimen: Blood   Result Value Ref Range    proBNP 137.0 0.0 - 450.0 pg/mL   High Sensitivity Troponin T    Collection Time: 05/06/23  2:52 PM    Specimen: Blood   Result Value Ref Range    HS Troponin T <6 <10 ng/L   D-dimer, Quantitative    Collection Time: 05/06/23  2:52 PM    Specimen: Blood   Result Value Ref Range    D-Dimer, Quantitative <0.27 0.00 - 0.50 MCGFEU/mL   CBC Auto Differential    Collection Time: 05/06/23  2:52 PM    Specimen: Blood   Result Value Ref Range    WBC 14.58 (H) 3.40 - 10.80 10*3/mm3    RBC 4.34 3.77 - 5.28 10*6/mm3    Hemoglobin 12.9 12.0 - 15.9 g/dL    Hematocrit 39.1 34.0 - 46.6 %    MCV 90.1 79.0 - 97.0 fL    MCH 29.7 26.6 - 33.0 pg    MCHC 33.0 31.5 - 35.7 g/dL    RDW 11.9 (L) 12.3 - 15.4 %    RDW-SD 38.8 37.0 - 54.0 fl    MPV 9.0 6.0 - 12.0  fL    Platelets 313 140 - 450 10*3/mm3    Neutrophil % 44.1 42.7 - 76.0 %    Lymphocyte % 42.5 19.6 - 45.3 %    Monocyte % 8.3 5.0 - 12.0 %    Eosinophil % 1.9 0.3 - 6.2 %    Basophil % 1.3 0.0 - 1.5 %    Immature Grans % 1.9 (H) 0.0 - 0.5 %    Neutrophils, Absolute 6.43 1.70 - 7.00 10*3/mm3    Lymphocytes, Absolute 6.20 (H) 0.70 - 3.10 10*3/mm3    Monocytes, Absolute 1.21 (H) 0.10 - 0.90 10*3/mm3    Eosinophils, Absolute 0.27 0.00 - 0.40 10*3/mm3    Basophils, Absolute 0.19 0.00 - 0.20 10*3/mm3    Immature Grans, Absolute 0.28 (H) 0.00 - 0.05 10*3/mm3    nRBC 0.0 0.0 - 0.2 /100 WBC   ECG 12 Lead Dyspnea    Collection Time: 05/06/23  3:26 PM   Result Value Ref Range    QT Interval 355 ms       Ordered the above labs and reviewed the results.        RADIOLOGY  XR Chest 2 View    Result Date: 5/6/2023  PA AND LATERAL CHEST X-RAY  HISTORY: Shortness of breath for several days.  Chest x-ray consisting of PA and lateral views is provided. Correlation: None.  FINDINGS: The cardiomediastinal silhouette is normal. The lungs are clear. The costophrenic sulci are dry and the bones appear normal. There is no pneumothorax.      Negative.  This report was finalized on 5/6/2023 3:57 PM by Dr. David Marie M.D.        Ordered the above noted radiological studies.  Patient independently interpreted by me in PACS and shows no evidence of pneumothorax          PROCEDURES  Procedures      EKG          EKG time: 1526  Rhythm/Rate: Normal sinus rhythm 84  P waves and WV: Normal P waves  QRS, axis: Normal QRS  ST and T waves: Normal ST-T wave    Interpreted Contemporaneously by me, independently viewed  No prior      MEDICATIONS GIVEN IN ER  Medications   potassium chloride (K-DUR,KLOR-CON) ER tablet 40 mEq (40 mEq Oral Given 5/6/23 1645)                   MEDICAL DECISION MAKING, PROGRESS, and CONSULTS     Discussion below represents my analysis of pertinent findings related to patient's condition, differential diagnosis, treatment  plan and final disposition.      Additional sources:  - Discussed/ obtained information from independent historians: None    - External (non-ED) record review: Epic reviewed patient was seen in her primary OB/GYN's office yesterday for IUD placement    - Chronic or social conditions impacting care: None    - Shared decision making: None      Orders placed during this visit:  Orders Placed This Encounter   Procedures   • Respiratory Panel PCR w/COVID-19(SARS-CoV-2) HEATHER/TAMERA/SYED/PAD/COR/MAD/TRENT In-House, NP Swab in UTM/VTM, 3-4 HR TAT - Swab, Nasopharynx   • XR Chest 2 View   • Comprehensive Metabolic Panel   • BNP   • High Sensitivity Troponin T   • D-dimer, Quantitative   • CBC Auto Differential   • High Sensitivity Troponin T 2Hr   • Discontinue Patient Isolation   • ECG 12 Lead Dyspnea   • CBC & Differential         Additional orders considered but not ordered:  None        Differential diagnosis includes but is not limited to:    Viral upper respiratory infection, pneumonia, asthma exacerbation      Independent interpretation of labs, radiology studies, and discussions with consultants:  ED Course as of 05/06/23 1748   Sat May 06, 2023   1630 16:30 EDT  Patient presents for evaluation of shortness of breath.  Patient has had some recent wheezing and now on albuterol steroids and antibiotics.  Patient not wheezing here.  Patient's chest x-ray negative.  Patient has normal D-dimer so doubt PE.  Normal BNP.  Her EKG and high-sensitivity troponin are all negative.  Patient will be discharged home.  We will supplement her potassium.  Will be given the names of [facility] for follow-up.  Instructed to return here for any concerns [SL]      ED Course User Index  [SL] Arjun Winn MD                 DIAGNOSIS  Final diagnoses:   Dyspnea, unspecified type   Hypokalemia         DISPOSITION  DISCHARGE    Patient discharged in stable condition.    Reviewed implications of results,  diagnosis, meds, responsibility to follow up, warning signs and symptoms of possible worsening, potential complications and reasons to return to ER, including worsening symptoms    Patient/Family voiced understanding of above instructions.    Discussed plan for discharge, as there is no emergent indication for admission. Patient referred to primary care provider for BP management due to today's BP. Pt/family is agreeable and understands need for follow up and repeat testing.  Pt is aware that discharge does not mean that nothing is wrong but it indicates no emergency is present that requires admission and they must continue care with follow-up as given below or physician of their choice.     FOLLOW-UP  Julio Cesar Benson MD  [address]  [facility] [address]  [phone]    Schedule an appointment as soon as possible for a visit       [facility]  [address]  [place] [id number] [phone]  Schedule an appointment as soon as possible for a visit       [facility]  [address]  [place] [address]  [phone]  Schedule an appointment as soon as possible for a visit            Medication List      No changes were made to your prescriptions during this visit.                 Latest Documented Vital Signs:  As of 17:48 EDT  BP- 119/65 HR- 80 Temp- 99.4 °F (37.4 °C) O2 sat- 99%              --    Please note that portions of this were completed with a voice recognition program.       Note Disclaimer: At [facility], we believe that sharing information builds trust and better relationships. You are receiving this note because you are receiving care at [facility] or recently visited. It is possible you will see health information before a provider has talked with you about it. This kind of information can be easy to misunderstand. To help you fully understand what it means for your health, we urge you to discuss this note with  your provider.           Arjun Winn MD  05/06/23 9061

## 2023-05-31 ENCOUNTER — OFFICE VISIT (OUTPATIENT)
Dept: FAMILY MEDICINE CLINIC | Facility: CLINIC | Age: 38
End: 2023-05-31
Payer: COMMERCIAL

## 2023-05-31 VITALS
DIASTOLIC BLOOD PRESSURE: 72 MMHG | BODY MASS INDEX: 24.49 KG/M2 | HEIGHT: 65 IN | RESPIRATION RATE: 16 BRPM | WEIGHT: 147 LBS | HEART RATE: 87 BPM | OXYGEN SATURATION: 99 % | SYSTOLIC BLOOD PRESSURE: 120 MMHG

## 2023-05-31 DIAGNOSIS — R07.89 FEELING OF CHEST TIGHTNESS: ICD-10-CM

## 2023-05-31 DIAGNOSIS — D72.829 LEUKOCYTOSIS, UNSPECIFIED TYPE: ICD-10-CM

## 2023-05-31 DIAGNOSIS — E87.6 HYPOKALEMIA: ICD-10-CM

## 2023-05-31 DIAGNOSIS — R06.00 DYSPNEA, UNSPECIFIED TYPE: Primary | ICD-10-CM

## 2023-05-31 PROCEDURE — 99213 OFFICE O/P EST LOW 20 MIN: CPT | Performed by: NURSE PRACTITIONER

## 2023-05-31 NOTE — PROGRESS NOTES
"Chief Complaint  Shortness of Breath    Subjective          Tanisha POON presents to [facility] for  History of Present Illness  She is, new to me, here to follow-up on ED visit relating to dyspnea.  She is an avid hiker, uses elliptical, she does strength training and running.  She reports that she cannot get a full deep breath in for the first 5-6 breaths, then will breathe normal for about 5-6 breaths, then back to not being able to get full deep breath in and cycle continues.  She will also have episode of only being able to utilize upper lung capacity like short shallow breaths.  She reports when breathes in and she is unable to get deep breath in she feels tightening in epigastric area.    Per Emergency Department recommendation, patient was suppose to follow-up with cardiology and pulmonolgy but has not done so.  Encouraged patient to call and make appointment as soon as possible with the following specialists:     [facility]  [address]  [place] [facility] [address] [phone]  Schedule an appointment as soon as possible for a visit       [facility]  [address]  [place] [facility] [address]  [phone]  Schedule an appointment as soon as possible for a visit       Review of Systems   Constitutional:  Negative for chills and fever.   Respiratory:  Positive for chest tightness. Negative for shortness of breath and wheezing.         Chest tightness in epigastric area when cannot take deep breath in    Cardiovascular:  Negative for chest pain, palpitations and leg swelling.   Neurological:  Negative for dizziness, syncope and light-headedness.       Objective   Vital Signs:   /72 (BP Location: Left arm, Patient Position: Sitting, Cuff Size: Adult)   Pulse 87   Resp 16   Ht 165.1 cm (65\")   Wt 66.7 kg (147 lb)   SpO2 99%   BMI 24.46 kg/m²     Physical Exam  Vitals and nursing note reviewed. "   Constitutional:       General: She is not in acute distress.     Appearance: Normal appearance.   HENT:      Head: Normocephalic and atraumatic.   Eyes:      Conjunctiva/sclera: Conjunctivae normal.      Pupils: Pupils are equal, round, and reactive to light.   Cardiovascular:      Rate and Rhythm: Normal rate and regular rhythm.      Heart sounds: Normal heart sounds. No murmur heard.  Pulmonary:      Effort: Pulmonary effort is normal. No respiratory distress.      Breath sounds: Normal breath sounds. No wheezing, rhonchi or rales.   Skin:     General: Skin is warm and dry.      Findings: No erythema.   Neurological:      General: No focal deficit present.      Mental Status: She is alert.   Psychiatric:         Mood and Affect: Mood normal.      Result Review :   The following data was reviewed by: SHAW Delatorre on 05/31/2023:  CMP          5/6/2023    14:52 5/31/2023    16:11   CMP   Glucose 110  134    BUN 13  12    Creatinine 0.86  1.00    EGFR 87.7     Sodium 140  142    Potassium 3.2  3.5    Chloride 106  107    Calcium 9.5  9.3    Total Protein  6.1    Total Protein 6.5     Albumin 4.4  4.5    Globulin  1.6    Globulin 2.1     Total Bilirubin 0.4  0.3    Alkaline Phosphatase 61  67    AST (SGOT) 12  13    ALT (SGPT) 17  8    Albumin/Globulin Ratio 2.1     BUN/Creatinine Ratio 15.1  12.0    Anion Gap 12.8       CBC w/diff          5/6/2023    14:52 5/31/2023    16:11   CBC w/Diff   WBC 14.58  6.98    RBC 4.34  4.07    Hemoglobin 12.9  12.1    Hematocrit 39.1  36.3    MCV 90.1  89.2    MCH 29.7  29.7    MCHC 33.0  33.3    RDW 11.9  11.4    Platelets 313  228    Neutrophil Rel % 44.1  37.0    Immature Granulocyte Rel % 1.9     Lymphocyte Rel % 42.5  49.0    Monocyte Rel % 8.3  6.6    Eosinophil Rel % 1.9  6.0    Basophil Rel % 1.3  1.1      Data reviewed : Recent hospitalization notes  ED Notes dated 5/6/23           Assessment and Plan    Diagnoses and all orders for this visit:    1. Dyspnea,  unspecified type (Primary)  Assessment & Plan:  C/O Not getting full deep breath in for the first 5-6 breaths, then will breathe normal for about 5-6 breaths, then back to not able to get full deep breath in and cycle continues.  Patient instructed to follow-up with cardiology and pulmonology as discussed and she agreed.    Orders:  -     CBC & Differential    2. Feeling of chest tightness  Assessment & Plan:  C/O When unable to get deep breath in she feels tightening in epigastric area.  Patient instructed to follow-up with cardiology and pulmonology as discussed and she agreed.      3. Leukocytosis, unspecified type  Comments:  WBC 14.58 on prior lab from 5/6/23.  Lab:  CBC  Orders:  -     CBC & Differential    4. Hypokalemia  Comments:  K 3.2 on labs from 5/6/23.  Lab:  CMP  Orders:  -     Comprehensive Metabolic Panel    Provided names and phone numbers of Cardiology and Pulmonology to patient so that she can call and follow-up with them regarding her continue dyspnea and tightening in epigastric pain.  Patient agreed to call an make an appointment with both cardiology and pulmonology.       Follow Up   No follow-ups on file.  Patient was given instructions and counseling regarding her condition or for health maintenance advice. Please see specific information pulled into the AVS if appropriate.

## 2023-06-01 LAB
ALBUMIN SERPL-MCNC: 4.5 G/DL (ref 3.5–5.2)
ALBUMIN/GLOB SERPL: 2.8 G/DL
ALP SERPL-CCNC: 67 U/L (ref 39–117)
ALT SERPL-CCNC: 8 U/L (ref 1–33)
AST SERPL-CCNC: 13 U/L (ref 1–32)
BASOPHILS # BLD AUTO: 0.08 10*3/MM3 (ref 0–0.2)
BASOPHILS NFR BLD AUTO: 1.1 % (ref 0–1.5)
BILIRUB SERPL-MCNC: 0.3 MG/DL (ref 0–1.2)
BUN SERPL-MCNC: 12 MG/DL (ref 6–20)
BUN/CREAT SERPL: 12 (ref 7–25)
CALCIUM SERPL-MCNC: 9.3 MG/DL (ref 8.6–10.5)
CHLORIDE SERPL-SCNC: 107 MMOL/L (ref 98–107)
CO2 SERPL-SCNC: 21.9 MMOL/L (ref 22–29)
CREAT SERPL-MCNC: 1 MG/DL (ref 0.57–1)
EGFRCR SERPLBLD CKD-EPI 2021: 73.2 ML/MIN/1.73
EOSINOPHIL # BLD AUTO: 0.42 10*3/MM3 (ref 0–0.4)
EOSINOPHIL NFR BLD AUTO: 6 % (ref 0.3–6.2)
ERYTHROCYTE [DISTWIDTH] IN BLOOD BY AUTOMATED COUNT: 11.4 % (ref 12.3–15.4)
GLOBULIN SER CALC-MCNC: 1.6 GM/DL
GLUCOSE SERPL-MCNC: 134 MG/DL (ref 65–99)
HCT VFR BLD AUTO: 36.3 % (ref 34–46.6)
HGB BLD-MCNC: 12.1 G/DL (ref 12–15.9)
IMM GRANULOCYTES # BLD AUTO: 0.02 10*3/MM3 (ref 0–0.05)
IMM GRANULOCYTES NFR BLD AUTO: 0.3 % (ref 0–0.5)
LYMPHOCYTES # BLD AUTO: 3.42 10*3/MM3 (ref 0.7–3.1)
LYMPHOCYTES NFR BLD AUTO: 49 % (ref 19.6–45.3)
MCH RBC QN AUTO: 29.7 PG (ref 26.6–33)
MCHC RBC AUTO-ENTMCNC: 33.3 G/DL (ref 31.5–35.7)
MCV RBC AUTO: 89.2 FL (ref 79–97)
MONOCYTES # BLD AUTO: 0.46 10*3/MM3 (ref 0.1–0.9)
MONOCYTES NFR BLD AUTO: 6.6 % (ref 5–12)
NEUTROPHILS # BLD AUTO: 2.58 10*3/MM3 (ref 1.7–7)
NEUTROPHILS NFR BLD AUTO: 37 % (ref 42.7–76)
NRBC BLD AUTO-RTO: 0 /100 WBC (ref 0–0.2)
PLATELET # BLD AUTO: 228 10*3/MM3 (ref 140–450)
POTASSIUM SERPL-SCNC: 3.5 MMOL/L (ref 3.5–5.2)
PROT SERPL-MCNC: 6.1 G/DL (ref 6–8.5)
RBC # BLD AUTO: 4.07 10*6/MM3 (ref 3.77–5.28)
SODIUM SERPL-SCNC: 142 MMOL/L (ref 136–145)
WBC # BLD AUTO: 6.98 10*3/MM3 (ref 3.4–10.8)

## 2023-06-11 PROBLEM — R07.89 FEELING OF CHEST TIGHTNESS: Status: ACTIVE | Noted: 2023-06-11

## 2023-06-11 PROBLEM — R06.00 DYSPNEA: Status: ACTIVE | Noted: 2023-06-11

## 2023-06-11 NOTE — ASSESSMENT & PLAN NOTE
C/O Not getting full deep breath in for the first 5-6 breaths, then will breathe normal for about 5-6 breaths, then back to not able to get full deep breath in and cycle continues.  Patient instructed to follow-up with cardiology and pulmonology as discussed and she agreed.

## 2023-06-11 NOTE — ASSESSMENT & PLAN NOTE
C/O When unable to get deep breath in she feels tightening in epigastric area.  Patient instructed to follow-up with cardiology and pulmonology as discussed and she agreed.

## 2023-06-14 ENCOUNTER — OFFICE VISIT (OUTPATIENT)
Dept: CARDIOLOGY | Facility: CLINIC | Age: 38
End: 2023-06-14
Payer: COMMERCIAL

## 2023-06-14 VITALS
WEIGHT: 142.6 LBS | SYSTOLIC BLOOD PRESSURE: 98 MMHG | HEART RATE: 77 BPM | BODY MASS INDEX: 23.76 KG/M2 | DIASTOLIC BLOOD PRESSURE: 68 MMHG | HEIGHT: 65 IN | OXYGEN SATURATION: 99 %

## 2023-06-14 DIAGNOSIS — Z82.49 FAMILY HISTORY OF PREMATURE CORONARY ARTERY DISEASE: ICD-10-CM

## 2023-06-14 DIAGNOSIS — R06.02 SHORTNESS OF BREATH: Primary | ICD-10-CM

## 2023-06-14 PROCEDURE — 99203 OFFICE O/P NEW LOW 30 MIN: CPT | Performed by: INTERNAL MEDICINE

## 2023-06-14 PROCEDURE — 93000 ELECTROCARDIOGRAM COMPLETE: CPT | Performed by: INTERNAL MEDICINE

## 2023-06-14 RX ORDER — CHOLECALCIFEROL (VITAMIN D3) 125 MCG
10 CAPSULE ORAL
COMMUNITY

## 2023-06-14 RX ORDER — MELATONIN
2000 DAILY
COMMUNITY

## 2023-06-14 RX ORDER — DOXEPIN HYDROCHLORIDE 10 MG/1
10 CAPSULE ORAL NIGHTLY
COMMUNITY

## 2023-06-14 NOTE — LETTER
June 14, 2023       No Recipients    Patient: Tanisha POON   YOB: 1985   Date of Visit: 6/14/2023       Dear Fabi Cummings APRN:    Thank you for referring Tanisha POON to me for evaluation. Below are the relevant portions of my assessment and plan of care.    Encounter Diagnosis and Orders:  Diagnoses and all orders for this visit:    1. Shortness of breath (Primary)  -     ECG 12 Lead    2. Family history of premature coronary artery disease        If you have questions, please do not hesitate to call me. I look forward to following Tanisha along with you.         Sincerely,        Mu Moctezuma MD        CC:   No Recipients

## 2023-06-14 NOTE — PROGRESS NOTES
PATIENTINFORMATION    Date of Office Visit: 2023  Encounter Provider: Mu Moctezuma MD  Place of Service: [facility] CARDIOLOGY  Patient Name: Tanisha POON  : 1985    Subjective:     Encounter Date:2023      Patient ID: Tanisha POON is a 38 y.o. female.    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  Shortness of breath comes randomly like in the morning and usually at rest and last for several minutes.  She describes episodes as not being able to get adequate air.  She denies any wheezing, cough or any other associated symptoms.  No known exacerbating or relieving factor.  She is an avid hiker and exercise regularly without any significant breathing problems, chest pain.  She just hiked and altitude of 2500 feet yesterday without significant limiting symptoms.  She is getting physical therapy for low back pain from prior injuries otherwise no significant complaints.  She denies any presyncope or syncope, orthopnea, PND, palpitations or extremity swelling.  She was evaluated by pulmonologist for similar complaints and no abnormalities found.  No prior known cardiovascular illness.  Her father had coronary artery disease/bypass surgery in his 30s and currently alive.  He used to smoke heavy.  Her sibling does not have coronary artery disease.    She denies any current tobacco use, recreational drug use or alcohol abuse.      ROS  All systems reviewed and negative except as noted in HPI.    Past Medical History:   Diagnosis Date    Allergic     Allergic rhinitis     Asthma     Bipolar disorder, unspecified     meds with psych, doing well    Depression with anxiety     Diarrhea, unspecified     Increased BMI     Medial epicondylitis, right elbow      "Nevus, non-neoplastic     Obesity, unspecified     Pain in unspecified ankle and joints of unspecified foot     Papanicolaou smear     normal, getting yearly     Rash and nonspecific skin eruption     Unspecified asthma, uncomplicated     Unspecified superficial injury of right index finger, initial encounter        Past Surgical History:   Procedure Laterality Date    TONSILLECTOMY      AGE 6        Social History     Socioeconomic History    Marital status:     Number of children: 0   Tobacco Use    Smoking status: Never     Passive exposure: Never    Smokeless tobacco: Never    Tobacco comments:     Caffeine use   Vaping Use    Vaping Use: Never used   Substance and Sexual Activity    Alcohol use: Not Currently     Comment: history of alcohol abuse problems in past related to depressive/manic episodes,     Drug use: Never    Sexual activity: Not Currently     Partners: Male     Birth control/protection: I.U.D.       Family History   Problem Relation Age of Onset    Heart attack Father         IN HIS 30S    Cancer Father     Heart disease Father     Mental illness Father     Kidney cancer Paternal Grandfather     Heart attack Other     Stroke Other     Breast cancer Maternal Aunt     Cancer Maternal Aunt         Breast           ECG 12 Lead    Date/Time: 6/14/2023 10:12 AM  Performed by: Mu Moctezuma MD  Authorized by: Mu Moctezuma MD   Comparison: compared with previous ECG from 5/6/2023  Rhythm: sinus rhythm  Rate: normal  Conduction: conduction normal  ST Segments: ST segments normal  T Waves: T waves normal  QRS axis: normal  Other: no other findings    Clinical impression: normal ECG           Objective:     BP 98/68   Pulse 77   Ht 165.1 cm (65\")   Wt 64.7 kg (142 lb 9.6 oz)   SpO2 99%   BMI 23.73 kg/m²  Body mass index is 23.73 kg/m².     Constitutional:       General: Not in acute distress.     Appearance: Well-developed. Not diaphoretic.   Eyes:      Pupils: Pupils are " equal, round, and reactive to light.   HENT:      Head: Normocephalic and atraumatic.   Neck:      Thyroid: No thyromegaly.   Pulmonary:      Effort: Pulmonary effort is normal. No respiratory distress.      Breath sounds: Normal breath sounds. No wheezing. No rales.   Chest:      Chest wall: Not tender to palpatation.   Cardiovascular:      Normal rate. Regular rhythm.      No gallop.    Pulses:     Intact distal pulses.   Edema:     Peripheral edema absent.   Abdominal:      General: Bowel sounds are normal. There is no distension.      Palpations: Abdomen is soft.      Tenderness: There is no guarding.   Musculoskeletal: Normal range of motion.         General: No deformity.      Cervical back: Normal range of motion and neck supple. Skin:     General: Skin is warm and dry.      Findings: No rash.   Neurological:      Mental Status: Alert and oriented to person, place, and time.      Cranial Nerves: No cranial nerve deficit.      Deep Tendon Reflexes: Reflexes are normal and symmetric.   Psychiatric:         Judgment: Judgment normal.       Review Of Data: I have reviewed pertinent recent labs, images and documents and pertinent findings included in HPI or assessment below.          Assessment/Plan:         Intermittent resting shortness of breath without any associated symptoms.  Normal cardiovascular examination.  Normal vital signs.  She exercises regularly without limiting symptoms other than low back pain.  EKG is normal.  Likely episodes are from anxiety and does not warrant any further cardiovascular testing.  Encouraged Tanisha to continue exercising and call office with any concerning symptoms.  Family history of premature coronary artery disease-she is interested to get vascular screening and information provided.  Mild hypercholesterolemia    Follow-up with cardiology clinic as needed.      Diagnosis and plan of care discussed with patient and verbalized understanding.            Your medication list             Accurate as of June 14, 2023 10:14 AM. If you have any questions, ask your nurse or doctor.                CONTINUE taking these medications        Instructions Last Dose Given Next Dose Due   albuterol sulfate  (90 Base) MCG/ACT inhaler  Commonly known as: PROVENTIL HFA;VENTOLIN HFA;PROAIR HFA           budesonide-formoterol 160-4.5 MCG/ACT inhaler  Commonly known as: SYMBICORT      Inhale 2 puffs 2 (Two) Times a Day.       cetirizine 10 MG tablet  Commonly known as: zyrTEC      Take 1 tablet by mouth Daily.       cholecalciferol 25 MCG (1000 UT) tablet  Commonly known as: VITAMIN D3      Take 2 tablets by mouth Daily.       doxepin 10 MG capsule  Commonly known as: SINEquan      Take 1 capsule by mouth Every Night.       fish oil 1000 MG capsule capsule      Take  by mouth Daily With Breakfast.       gabapentin 800 MG tablet  Commonly known as: NEURONTIN      Take 900 mg by mouth 3 (Three) Times a Day.       lamoTRIgine 200 MG tablet  Commonly known as: LaMICtal      Take 2 tablets by mouth Daily.       Magnesium 100 MG tablet      Take  by mouth.       melatonin 5 MG tablet tablet      Take 2 tablets by mouth.       Mirena (52 MG) 20 MCG/DAY intrauterine device IUD  Generic drug: Levonorgestrel           montelukast 10 MG tablet  Commonly known as: SINGULAIR           topiramate 100 MG tablet  Commonly known as: TOPAMAX                        Mu Moctezuma MD  06/14/23  10:14 EDT

## 2023-09-12 ENCOUNTER — OFFICE VISIT (OUTPATIENT)
Dept: FAMILY MEDICINE CLINIC | Facility: CLINIC | Age: 38
End: 2023-09-12
Payer: COMMERCIAL

## 2023-09-12 VITALS
SYSTOLIC BLOOD PRESSURE: 118 MMHG | DIASTOLIC BLOOD PRESSURE: 68 MMHG | WEIGHT: 151.8 LBS | OXYGEN SATURATION: 98 % | BODY MASS INDEX: 25.29 KG/M2 | HEIGHT: 65 IN | HEART RATE: 64 BPM | RESPIRATION RATE: 16 BRPM

## 2023-09-12 DIAGNOSIS — R06.02 SHORTNESS OF BREATH: ICD-10-CM

## 2023-09-12 DIAGNOSIS — M25.512 LEFT ANTERIOR SHOULDER PAIN: Primary | ICD-10-CM

## 2023-09-12 PROCEDURE — 99213 OFFICE O/P EST LOW 20 MIN: CPT | Performed by: NURSE PRACTITIONER

## 2023-09-12 RX ORDER — CYCLOBENZAPRINE HCL 5 MG
5 TABLET ORAL 3 TIMES DAILY PRN
COMMUNITY

## 2023-09-12 NOTE — PROGRESS NOTES
Chief Complaint  Shoulder Pain (Left shoulder pain down to arm and cause some head pain )    Subjective          Tanisha POON presents to [facility] for  History of Present Illness  She is here to follow-up on SOB and with complaint of left shoulder pain:    SOB - she denies SOB today.  She reports went to cardiology who confirmed SOB is related to anxiety.  She is currently seeing Psychiatrist and Therapist.  She is on her second visit with Therapist and just getting acquainted with new therapist right now.  Psychiatrist indicated she is maxed out on her medications and has to deal with anxiety through therapy.  She saw [facility] on 6/6/23 and was told everything was normal.    Left Shoulder Pain -  she has been having shoulder pain and weakness and has been in physical therapy since March, 2023.   After hiking trip in July, 2023 her pain has worsened.  Left shoulder pain that radiates up toward left neck into base of skull.  Her pain feels like throbbing and shooting.  Pain level today 6-7/10 that comes and goes.  She reports if she rolls over on shoulder in bed her level increases to 8-9/10.  Since March, she has hiked a good 500 miles with walking sticks and a backpack on her back.  She is currently taking Cyclobenzaprine that takes edge off but does not take pain away.  She has not ever had imaging done on left shoulder.  She has not seen orthopedic specialist for it.  She sees PT once to twice weekly.      Review of Systems   Constitutional:  Negative for chills and fever.   Respiratory:  Negative for shortness of breath.    Cardiovascular:  Negative for chest pain and palpitations.   Musculoskeletal:  Positive for joint swelling, myalgias, neck pain and neck stiffness. Negative for arthralgias and back pain.        Left shoulder pain/weakness that radiates up neck   Neurological:  Positive for weakness. Negative for dizziness and light-headedness.         "Weakness in left arm         Objective   Vital Signs:   /68 (BP Location: Left arm, Patient Position: Sitting, Cuff Size: Adult)   Pulse 64   Resp 16   Ht 165.1 cm (65\")   Wt 68.9 kg (151 lb 12.8 oz)   SpO2 98%   BMI 25.26 kg/mý     Physical Exam  Vitals and nursing note reviewed.   Constitutional:       General: She is not in acute distress.     Appearance: Normal appearance.   HENT:      Head: Normocephalic and atraumatic.   Eyes:      Conjunctiva/sclera: Conjunctivae normal.   Cardiovascular:      Rate and Rhythm: Normal rate and regular rhythm.      Pulses: Normal pulses.      Heart sounds: Normal heart sounds. No murmur heard.  Pulmonary:      Effort: Pulmonary effort is normal. No respiratory distress.      Breath sounds: Normal breath sounds. No wheezing.   Musculoskeletal:         General: Tenderness present. No swelling or signs of injury. Normal range of motion.      Comments: Tenderness on palpation over left anterior shoulder joint.  Normal ROM.     Skin:     General: Skin is warm and dry.      Findings: No erythema.   Neurological:      Mental Status: She is alert.   Psychiatric:         Mood and Affect: Mood normal.         Behavior: Behavior normal.        Result Review :   The following data was reviewed by: SHAW Delatorre on 09/12/2023:  CMP          5/6/2023    14:52 5/31/2023    16:11   CMP   Glucose 110  134    BUN 13  12    Creatinine 0.86  1.00    EGFR 87.7     Sodium 140  142    Potassium 3.2  3.5    Chloride 106  107    Calcium 9.5  9.3    Total Protein  6.1    Total Protein 6.5     Albumin 4.4  4.5    Globulin  1.6    Globulin 2.1     Total Bilirubin 0.4  0.3    Alkaline Phosphatase 61  67    AST (SGOT) 12  13    ALT (SGPT) 17  8    Albumin/Globulin Ratio 2.1     BUN/Creatinine Ratio 15.1  12.0    Anion Gap 12.8       CBC w/diff          5/6/2023    14:52 5/31/2023    16:11   CBC w/Diff   WBC 14.58  6.98    RBC 4.34  4.07    Hemoglobin 12.9  12.1    Hematocrit 39.1  36.3  "   MCV 90.1  89.2    MCH 29.7  29.7    MCHC 33.0  33.3    RDW 11.9  11.4    Platelets 313  228    Neutrophil Rel % 44.1  37.0    Immature Granulocyte Rel % 1.9     Lymphocyte Rel % 42.5  49.0    Monocyte Rel % 8.3  6.6    Eosinophil Rel % 1.9  6.0    Basophil Rel % 1.3  1.1      Data reviewed : Consultant notes Dr. Mu Moctezuma, Cardiology 6/14/23           Assessment and Plan    Diagnoses and all orders for this visit:    1. Left anterior shoulder pain (Primary)  Assessment & Plan:  Discussed importance of resting left shoulder.  Alternate heat/ice.  RX: Diclofenac 50mg BID PRN pain  Continue PT.  Re-assess in 2 months.    Orders:  -     diclofenac (VOLTAREN) 50 MG EC tablet; Take 1 tablet by mouth 2 (Two) Times a Day As Needed (left shoulder pain).  Dispense: 60 tablet; Refill: 1    2. Shortness of breath  Assessment & Plan:  Improved.  Cardiology confirmed related to anxiety.  Recommended following-up with Psychiatrist regarding increased anxiety causing SOB and she agreed.  Will continue to monitor.          Follow Up   Return in about 2 months (around 11/12/2023) for Next scheduled follow up - Left Anterior Shoulder Pain..  Patient was given instructions and counseling regarding her condition or for health maintenance advice. Please see specific information pulled into the AVS if appropriate.

## 2023-10-09 PROBLEM — M25.512 LEFT ANTERIOR SHOULDER PAIN: Status: ACTIVE | Noted: 2023-10-09

## 2023-10-10 NOTE — ASSESSMENT & PLAN NOTE
Improved.  Cardiology confirmed related to anxiety.  Recommended following-up with Psychiatrist regarding increased anxiety causing SOB and she agreed.  Will continue to monitor.

## 2023-10-10 NOTE — ASSESSMENT & PLAN NOTE
Discussed importance of resting left shoulder.  Alternate heat/ice.  RX: Diclofenac 50mg BID PRN pain  Continue PT.  Re-assess in 2 months.

## 2024-02-05 ENCOUNTER — OFFICE VISIT (OUTPATIENT)
Dept: FAMILY MEDICINE CLINIC | Facility: CLINIC | Age: 39
End: 2024-02-05
Payer: COMMERCIAL

## 2024-02-05 VITALS
SYSTOLIC BLOOD PRESSURE: 118 MMHG | BODY MASS INDEX: 23.49 KG/M2 | DIASTOLIC BLOOD PRESSURE: 76 MMHG | HEIGHT: 65 IN | WEIGHT: 141 LBS | OXYGEN SATURATION: 100 % | HEART RATE: 79 BPM

## 2024-02-05 DIAGNOSIS — Z00.00 HEALTH CARE MAINTENANCE: ICD-10-CM

## 2024-02-05 DIAGNOSIS — J45.20 MILD INTERMITTENT ASTHMA WITHOUT COMPLICATION: Chronic | ICD-10-CM

## 2024-02-05 DIAGNOSIS — Z13.1 SCREENING FOR DIABETES MELLITUS (DM): ICD-10-CM

## 2024-02-05 DIAGNOSIS — F51.05 INSOMNIA DUE TO OTHER MENTAL DISORDER: ICD-10-CM

## 2024-02-05 DIAGNOSIS — Z86.39 HISTORY OF VITAMIN D DEFICIENCY: ICD-10-CM

## 2024-02-05 DIAGNOSIS — Z13.220 SCREENING CHOLESTEROL LEVEL: ICD-10-CM

## 2024-02-05 DIAGNOSIS — Z00.00 ENCOUNTER FOR ANNUAL PHYSICAL EXAM: Primary | ICD-10-CM

## 2024-02-05 DIAGNOSIS — F41.9 ANXIETY: ICD-10-CM

## 2024-02-05 DIAGNOSIS — M25.50 GENERALIZED JOINT PAIN: ICD-10-CM

## 2024-02-05 DIAGNOSIS — F99 INSOMNIA DUE TO OTHER MENTAL DISORDER: ICD-10-CM

## 2024-02-05 DIAGNOSIS — F31.81 BIPOLAR 2 DISORDER: ICD-10-CM

## 2024-02-05 PROBLEM — S83.241A TEAR OF MEDIAL MENISCUS OF RIGHT KNEE, CURRENT: Status: RESOLVED | Noted: 2018-07-26 | Resolved: 2024-02-05

## 2024-02-05 PROCEDURE — 99396 PREV VISIT EST AGE 40-64: CPT | Performed by: NURSE PRACTITIONER

## 2024-02-05 RX ORDER — SODIUM PHOSPHATE,MONO-DIBASIC 19G-7G/118
1 ENEMA (ML) RECTAL
COMMUNITY

## 2024-02-05 RX ORDER — VIT C/B6/B5/MAGNESIUM/HERB 173 50-5-6-5MG
1 CAPSULE ORAL DAILY
COMMUNITY

## 2024-02-05 RX ORDER — MULTIPLE VITAMINS W/ MINERALS TAB 9MG-400MCG
1 TAB ORAL DAILY
COMMUNITY

## 2024-02-06 LAB
25(OH)D3+25(OH)D2 SERPL-MCNC: 56.3 NG/ML (ref 30–100)
ALBUMIN SERPL-MCNC: 5.1 G/DL (ref 3.9–4.9)
ALBUMIN/GLOB SERPL: 2.8 {RATIO} (ref 1.2–2.2)
ALP SERPL-CCNC: 55 IU/L (ref 44–121)
ALT SERPL-CCNC: 12 IU/L (ref 0–32)
AST SERPL-CCNC: 18 IU/L (ref 0–40)
BASOPHILS # BLD AUTO: 0.1 X10E3/UL (ref 0–0.2)
BASOPHILS NFR BLD AUTO: 1 %
BILIRUB SERPL-MCNC: 0.6 MG/DL (ref 0–1.2)
BUN SERPL-MCNC: 10 MG/DL (ref 6–24)
BUN/CREAT SERPL: 10 (ref 9–23)
CALCIUM SERPL-MCNC: 9.6 MG/DL (ref 8.7–10.2)
CHLORIDE SERPL-SCNC: 108 MMOL/L (ref 96–106)
CHOLEST SERPL-MCNC: 225 MG/DL (ref 100–199)
CO2 SERPL-SCNC: 22 MMOL/L (ref 20–29)
CREAT SERPL-MCNC: 0.97 MG/DL (ref 0.57–1)
EGFRCR SERPLBLD CKD-EPI 2021: 76 ML/MIN/1.73
EOSINOPHIL # BLD AUTO: 0.7 X10E3/UL (ref 0–0.4)
EOSINOPHIL NFR BLD AUTO: 10 %
ERYTHROCYTE [DISTWIDTH] IN BLOOD BY AUTOMATED COUNT: 12 % (ref 11.7–15.4)
GLOBULIN SER CALC-MCNC: 1.8 G/DL (ref 1.5–4.5)
GLUCOSE SERPL-MCNC: 96 MG/DL (ref 70–99)
HBA1C MFR BLD: 5.2 % (ref 4.8–5.6)
HCT VFR BLD AUTO: 40.8 % (ref 34–46.6)
HDLC SERPL-MCNC: 78 MG/DL
HGB BLD-MCNC: 13.5 G/DL (ref 11.1–15.9)
IMM GRANULOCYTES # BLD AUTO: 0 X10E3/UL (ref 0–0.1)
IMM GRANULOCYTES NFR BLD AUTO: 0 %
LDLC SERPL CALC-MCNC: 137 MG/DL (ref 0–99)
LYMPHOCYTES # BLD AUTO: 3.4 X10E3/UL (ref 0.7–3.1)
LYMPHOCYTES NFR BLD AUTO: 49 %
MCH RBC QN AUTO: 30 PG (ref 26.6–33)
MCHC RBC AUTO-ENTMCNC: 33.1 G/DL (ref 31.5–35.7)
MCV RBC AUTO: 91 FL (ref 79–97)
MONOCYTES # BLD AUTO: 0.4 X10E3/UL (ref 0.1–0.9)
MONOCYTES NFR BLD AUTO: 5 %
NEUTROPHILS # BLD AUTO: 2.5 X10E3/UL (ref 1.4–7)
NEUTROPHILS NFR BLD AUTO: 35 %
PLATELET # BLD AUTO: 277 X10E3/UL (ref 150–450)
POTASSIUM SERPL-SCNC: 4.4 MMOL/L (ref 3.5–5.2)
PROT SERPL-MCNC: 6.9 G/DL (ref 6–8.5)
RBC # BLD AUTO: 4.5 X10E6/UL (ref 3.77–5.28)
SODIUM SERPL-SCNC: 143 MMOL/L (ref 134–144)
TRIGL SERPL-MCNC: 56 MG/DL (ref 0–149)
VLDLC SERPL CALC-MCNC: 10 MG/DL (ref 5–40)
WBC # BLD AUTO: 7 X10E3/UL (ref 3.4–10.8)